# Patient Record
Sex: MALE | Employment: FULL TIME | URBAN - METROPOLITAN AREA
[De-identification: names, ages, dates, MRNs, and addresses within clinical notes are randomized per-mention and may not be internally consistent; named-entity substitution may affect disease eponyms.]

---

## 2023-08-20 PROBLEM — K64.4 EXTERNAL HEMORRHOID: Status: ACTIVE | Noted: 2023-08-20

## 2023-08-20 PROBLEM — L03.116 CELLULITIS OF LEFT LOWER LEG: Status: ACTIVE | Noted: 2023-08-20

## 2023-08-20 PROBLEM — I10 BENIGN ESSENTIAL HYPERTENSION: Status: ACTIVE | Noted: 2023-08-20

## 2023-08-20 PROBLEM — E66.9 CLASS 2 OBESITY WITH BODY MASS INDEX (BMI) OF 35.0 TO 35.9 IN ADULT: Status: ACTIVE | Noted: 2023-08-20

## 2023-08-20 PROBLEM — E55.9 VITAMIN D DEFICIENCY: Status: ACTIVE | Noted: 2023-08-20

## 2023-08-20 PROBLEM — K62.3 PARTIAL RECTAL PROLAPSE: Status: ACTIVE | Noted: 2023-08-20

## 2023-08-20 PROBLEM — R73.9 HYPERGLYCEMIA: Status: ACTIVE | Noted: 2023-08-20

## 2023-08-20 PROBLEM — N52.9 INABILITY TO MAINTAIN ERECTION: Status: ACTIVE | Noted: 2023-08-20

## 2023-08-20 PROBLEM — E66.812 CLASS 2 OBESITY WITH BODY MASS INDEX (BMI) OF 35.0 TO 35.9 IN ADULT: Status: ACTIVE | Noted: 2023-08-20

## 2023-08-20 PROBLEM — N40.0 BPH (BENIGN PROSTATIC HYPERPLASIA): Status: ACTIVE | Noted: 2023-08-20

## 2023-08-20 PROBLEM — K64.8 INTERNAL HEMORRHOIDS: Status: ACTIVE | Noted: 2023-08-20

## 2023-08-20 PROBLEM — E78.5 HYPERLIPIDEMIA: Status: ACTIVE | Noted: 2023-08-20

## 2023-08-20 PROBLEM — E29.1 HYPOGONADISM IN MALE: Status: ACTIVE | Noted: 2023-08-20

## 2023-08-20 PROBLEM — I80.00 THROMBOPHLEBITIS OF LEG, SUPERFICIAL: Status: ACTIVE | Noted: 2023-08-20

## 2023-08-20 RX ORDER — AMLODIPINE AND BENAZEPRIL HYDROCHLORIDE 10; 40 MG/1; MG/1
1 CAPSULE ORAL DAILY
COMMUNITY
End: 2023-10-25

## 2023-08-20 RX ORDER — MELATONIN 3 MG
1 CAPSULE ORAL NIGHTLY
COMMUNITY

## 2023-08-20 RX ORDER — ERGOCALCIFEROL 1.25 MG/1
1.25 CAPSULE ORAL
COMMUNITY
Start: 2021-12-08

## 2023-08-20 RX ORDER — ATORVASTATIN CALCIUM 20 MG/1
20 TABLET, FILM COATED ORAL DAILY
COMMUNITY
End: 2023-10-25

## 2023-08-20 RX ORDER — HYDROCORTISONE 25 MG/G
CREAM TOPICAL NIGHTLY
COMMUNITY
End: 2023-10-02 | Stop reason: ALTCHOICE

## 2023-08-20 RX ORDER — HYDROCHLOROTHIAZIDE 25 MG/1
25 TABLET ORAL DAILY
COMMUNITY
End: 2024-04-01 | Stop reason: SDUPTHER

## 2023-08-20 RX ORDER — ASCORBATE CALCIUM 500 MG
1 TABLET ORAL DAILY
COMMUNITY

## 2023-08-20 RX ORDER — MULTIVIT-MIN/IRON/FOLIC ACID/K 18-600-40
1 CAPSULE ORAL DAILY
COMMUNITY

## 2023-10-02 ENCOUNTER — OFFICE VISIT (OUTPATIENT)
Dept: PRIMARY CARE | Facility: CLINIC | Age: 63
End: 2023-10-02
Payer: COMMERCIAL

## 2023-10-02 VITALS
DIASTOLIC BLOOD PRESSURE: 86 MMHG | WEIGHT: 294 LBS | HEART RATE: 70 BPM | BODY MASS INDEX: 35.8 KG/M2 | SYSTOLIC BLOOD PRESSURE: 110 MMHG | HEIGHT: 76 IN

## 2023-10-02 DIAGNOSIS — E78.5 HYPERLIPIDEMIA, UNSPECIFIED HYPERLIPIDEMIA TYPE: ICD-10-CM

## 2023-10-02 DIAGNOSIS — I10 BENIGN ESSENTIAL HYPERTENSION: Primary | ICD-10-CM

## 2023-10-02 PROCEDURE — 1159F MED LIST DOCD IN RCRD: CPT | Performed by: FAMILY MEDICINE

## 2023-10-02 PROCEDURE — 1126F AMNT PAIN NOTED NONE PRSNT: CPT | Performed by: FAMILY MEDICINE

## 2023-10-02 PROCEDURE — 3079F DIAST BP 80-89 MM HG: CPT | Performed by: FAMILY MEDICINE

## 2023-10-02 PROCEDURE — 1160F RVW MEDS BY RX/DR IN RCRD: CPT | Performed by: FAMILY MEDICINE

## 2023-10-02 PROCEDURE — 99213 OFFICE O/P EST LOW 20 MIN: CPT | Performed by: FAMILY MEDICINE

## 2023-10-02 PROCEDURE — 3074F SYST BP LT 130 MM HG: CPT | Performed by: FAMILY MEDICINE

## 2023-10-02 ASSESSMENT — ENCOUNTER SYMPTOMS
LOSS OF SENSATION IN FEET: 0
DEPRESSION: 0
OCCASIONAL FEELINGS OF UNSTEADINESS: 0

## 2023-10-02 NOTE — PROGRESS NOTES
"HPI        SUBJECTIVE        OBJECTIVE          /86   Pulse 70   Ht 1.93 m (6' 4\")   Wt 133 kg (294 lb)   BMI 35.79 kg/m²     Past Medical History:   Diagnosis Date    Allergic contact dermatitis due to plants, except food 05/29/2021    Poison ivy dermatitis    Cellulitis of right upper limb 06/26/2017    Cellulitis of right upper extremity    Encounter for immunization     Encounter for immunization    Obesity, unspecified 05/11/2022    Class 2 obesity with body mass index (BMI) of 36.0 to 36.9 in adult    Personal history of other endocrine, nutritional and metabolic disease 12/08/2021    History of morbid obesity       Patient Active Problem List   Diagnosis    Benign essential hypertension    BPH (benign prostatic hyperplasia)    Hyperglycemia    Hyperlipidemia    Hypogonadism in male    Inability to maintain erection    Thrombophlebitis of leg, superficial    Vitamin D deficiency    Cellulitis of left lower leg    Class 2 obesity with body mass index (BMI) of 35.0 to 35.9 in adult    External hemorrhoid    Internal hemorrhoids    Partial rectal prolapse       Current Outpatient Medications   Medication Sig Dispense Refill    amLODIPine-benazepriL (Lotrel) 10-40 mg capsule Take 1 capsule by mouth once daily.      ascorbic acid, vitamin C, 500 mg capsule Take 1 capsule by mouth once daily.      atorvastatin (Lipitor) 20 mg tablet Take 1 tablet (20 mg) by mouth once daily.      ergocalciferol (Vitamin D-2) 1.25 MG (32299 UT) capsule Take 1 capsule (1,250 mcg) by mouth 1 (one) time per week.      GARLIC ORAL Take 1 tablet by mouth once daily.      hydroCHLOROthiazide (HYDRODiuril) 25 mg tablet Take 1 tablet (25 mg) by mouth once daily.      melatonin 3 mg capsule Take 1 capsule by mouth once daily at bedtime.      VALERIAN ROOT ORAL Take 1 capsule by mouth once daily.      vitamin E acid succinate (vitamin E succinate) 67 mg (100 unit) tablet Take 1 tablet by mouth once daily.      ZINC ORAL Take 1 tablet " by mouth once daily.       No current facility-administered medications for this visit.       ASSESSMENT/PLAN    Patient is here for had hemorrhoid surgery new procedure it went well.  Does need to see the eye doctor wrote that down for him.  1.  Hypertension controlled 2.  Hyperlipidemia patient has had lab work Saturday at [facility] that did not interface yet with chart will call me in the next week 3.  Increased BMI identification weight loss possibility patient otherwise has good healthy habits no high risk.  He did the Cologuard it was negative.  Was no apparent distress vital signs stable BMI is noted  Neck supple lungs clear to station normal S1-S2 without murmur thrill or rub  1.  Hypertension controlled presently does not need refill of medications going forward if he does let me know 2.  Hypercholesterolemia the but will check lab work that was done over the weekend be sure within a week to hear from us if not give us a call 3.  Increase BMI down modification weight loss possibility continue with good health habits and proceed from there questions concerns addressed.    There are no diagnoses linked to this encounter.

## 2024-04-01 ENCOUNTER — OFFICE VISIT (OUTPATIENT)
Dept: PRIMARY CARE | Facility: CLINIC | Age: 64
End: 2024-04-01
Payer: MEDICARE

## 2024-04-01 ENCOUNTER — APPOINTMENT (OUTPATIENT)
Dept: PRIMARY CARE | Facility: CLINIC | Age: 64
End: 2024-04-01
Payer: MEDICARE

## 2024-04-01 VITALS
TEMPERATURE: 98 F | BODY MASS INDEX: 36.53 KG/M2 | SYSTOLIC BLOOD PRESSURE: 165 MMHG | HEIGHT: 76 IN | WEIGHT: 300 LBS | DIASTOLIC BLOOD PRESSURE: 96 MMHG | HEART RATE: 69 BPM

## 2024-04-01 DIAGNOSIS — Z11.3 SCREEN FOR STD (SEXUALLY TRANSMITTED DISEASE): ICD-10-CM

## 2024-04-01 DIAGNOSIS — Z87.891 ENCOUNTER FOR SCREENING FOR ABDOMINAL AORTIC ANEURYSM (AAA) IN PATIENT 50 YEARS OF AGE OR OLDER WITH HISTORY OF SMOKING: ICD-10-CM

## 2024-04-01 DIAGNOSIS — E78.49 OTHER HYPERLIPIDEMIA: ICD-10-CM

## 2024-04-01 DIAGNOSIS — R20.8 DECREASED SENSE OF VIBRATION: ICD-10-CM

## 2024-04-01 DIAGNOSIS — Z23 ENCOUNTER FOR IMMUNIZATION: ICD-10-CM

## 2024-04-01 DIAGNOSIS — R73.9 HYPERGLYCEMIA: ICD-10-CM

## 2024-04-01 DIAGNOSIS — E55.9 VITAMIN D DEFICIENCY: ICD-10-CM

## 2024-04-01 DIAGNOSIS — Z13.29 THYROID DISORDER SCREENING: ICD-10-CM

## 2024-04-01 DIAGNOSIS — E78.2 MIXED HYPERLIPIDEMIA: ICD-10-CM

## 2024-04-01 DIAGNOSIS — R73.03 PREDIABETES: ICD-10-CM

## 2024-04-01 DIAGNOSIS — R20.2 TINGLING OF BOTH FEET: ICD-10-CM

## 2024-04-01 DIAGNOSIS — I10 BENIGN ESSENTIAL HYPERTENSION: Primary | ICD-10-CM

## 2024-04-01 DIAGNOSIS — Z13.6 ENCOUNTER FOR SCREENING FOR ABDOMINAL AORTIC ANEURYSM (AAA) IN PATIENT 50 YEARS OF AGE OR OLDER WITH HISTORY OF SMOKING: ICD-10-CM

## 2024-04-01 PROCEDURE — 1159F MED LIST DOCD IN RCRD: CPT | Performed by: INTERNAL MEDICINE

## 2024-04-01 PROCEDURE — 90677 PCV20 VACCINE IM: CPT | Performed by: INTERNAL MEDICINE

## 2024-04-01 PROCEDURE — 3080F DIAST BP >= 90 MM HG: CPT | Performed by: INTERNAL MEDICINE

## 2024-04-01 PROCEDURE — 90471 IMMUNIZATION ADMIN: CPT | Performed by: INTERNAL MEDICINE

## 2024-04-01 PROCEDURE — 99214 OFFICE O/P EST MOD 30 MIN: CPT | Performed by: INTERNAL MEDICINE

## 2024-04-01 PROCEDURE — 1160F RVW MEDS BY RX/DR IN RCRD: CPT | Performed by: INTERNAL MEDICINE

## 2024-04-01 PROCEDURE — 3077F SYST BP >= 140 MM HG: CPT | Performed by: INTERNAL MEDICINE

## 2024-04-01 RX ORDER — HYDROCHLOROTHIAZIDE 25 MG/1
25 TABLET ORAL DAILY
Qty: 90 TABLET | Refills: 1 | Status: SHIPPED | OUTPATIENT
Start: 2024-04-01 | End: 2024-09-28

## 2024-04-01 RX ORDER — AMLODIPINE AND BENAZEPRIL HYDROCHLORIDE 10; 40 MG/1; MG/1
1 CAPSULE ORAL DAILY
Qty: 90 CAPSULE | Refills: 1 | Status: SHIPPED | OUTPATIENT
Start: 2024-04-01 | End: 2024-09-28

## 2024-04-01 RX ORDER — ATORVASTATIN CALCIUM 20 MG/1
20 TABLET, FILM COATED ORAL EVERY OTHER DAY
Qty: 45 TABLET | Refills: 1 | Status: SHIPPED | OUTPATIENT
Start: 2024-04-01 | End: 2024-09-28

## 2024-04-01 ASSESSMENT — PATIENT HEALTH QUESTIONNAIRE - PHQ9
1. LITTLE INTEREST OR PLEASURE IN DOING THINGS: NOT AT ALL
2. FEELING DOWN, DEPRESSED OR HOPELESS: NOT AT ALL
SUM OF ALL RESPONSES TO PHQ9 QUESTIONS 1 AND 2: 0

## 2024-04-01 ASSESSMENT — ENCOUNTER SYMPTOMS
DEPRESSION: 0
OCCASIONAL FEELINGS OF UNSTEADINESS: 0
LOSS OF SENSATION IN FEET: 0

## 2024-04-01 NOTE — PROGRESS NOTES
"Subjective   Patient ID: Rupert Calderon is a 64 y.o. male who presents for Establish Care.    HPI Patient missed a couple days of blood pressure medication and now having some elevated blood pressures. Bottom of feet tingling.  This has been present for several months. Not pain. Patient has noticed more in the last 6 months.  Patient has sensation that the ankle of the right is tight. No leg swelling, Denies any changes in shoes. No pale feet or cold toes. No claudication symptoms. He will use Epsom salt and water as hot as he can take it.  Patient needs refills today on chronic medications for blood pressure, I cholesterol also due for pneumonia vaccine.  Patient also due for abdominal arctic aneurysm screening which we discussed given prior history of smoking.    Review of Systems   Constitutional:  Negative for chills and fever.   HENT:  Negative for sore throat.    Eyes:  Negative for visual disturbance.   Respiratory:  Negative for cough and shortness of breath.    Cardiovascular:  Negative for chest pain, palpitations and leg swelling.   Gastrointestinal:  Negative for abdominal pain, blood in stool, constipation, nausea and vomiting.   Genitourinary:  Negative for dysuria.   Skin:  Negative for rash.   Neurological:  Positive for numbness. Negative for dizziness, seizures, speech difficulty and light-headedness.   Psychiatric/Behavioral:  Negative for agitation.        Objective   BP (!) 165/96   Pulse 69   Temp 36.7 °C (98 °F) (Temporal)   Ht 1.93 m (6' 4\")   Wt 136 kg (300 lb)   BMI 36.52 kg/m²     Physical Exam  Vitals and nursing note reviewed.   Constitutional:       General: He is not in acute distress.     Appearance: Normal appearance. He is obese. He is not toxic-appearing.   HENT:      Head: Normocephalic and atraumatic.      Mouth/Throat:      Mouth: Mucous membranes are moist.      Pharynx: Oropharynx is clear. No oropharyngeal exudate.   Eyes:      Pupils: Pupils are equal, round, and " reactive to light.   Cardiovascular:      Rate and Rhythm: Normal rate and regular rhythm.      Heart sounds: Normal heart sounds.   Pulmonary:      Effort: Pulmonary effort is normal. No respiratory distress.      Breath sounds: Normal breath sounds. No wheezing or rales.   Abdominal:      General: Bowel sounds are normal. There is no distension.      Palpations: Abdomen is soft.      Tenderness: There is no abdominal tenderness.   Musculoskeletal:      Cervical back: Neck supple.      Right lower leg: No edema.      Left lower leg: No edema.   Lymphadenopathy:      Cervical: No cervical adenopathy.   Skin:     General: Skin is warm and dry.      Findings: No rash.   Neurological:      General: No focal deficit present.      Mental Status: He is alert and oriented to person, place, and time.      Cranial Nerves: No cranial nerve deficit.      Sensory: Sensory deficit (patient has decreased vibration sense with tuning fork exam) present.      Motor: No weakness.   Psychiatric:         Mood and Affect: Mood normal.         Behavior: Behavior normal.         Thought Content: Thought content normal.         Judgment: Judgment normal.         Assessment/Plan   Problem List Items Addressed This Visit             ICD-10-CM    Benign essential hypertension - Primary I10    Relevant Medications    amLODIPine-benazepriL (Lotrel) 10-40 mg capsule    hydroCHLOROthiazide (HYDRODiuril) 25 mg tablet    Other Relevant Orders    CBC and Auto Differential    Comprehensive metabolic panel    Vascular US abdominal aorta anuerysm AAA screening    Hyperglycemia R73.9    Hyperlipidemia E78.5    Relevant Medications    atorvastatin (Lipitor) 20 mg tablet    Other Relevant Orders    Lipid panel    Vitamin D deficiency E55.9    Relevant Orders    Vitamin D 25-Hydroxy,Total (for eval of Vitamin D levels)    Prediabetes R73.03    Relevant Orders    Hemoglobin A1C    Thyroid disorder screening Z13.29    Relevant Orders    TSH with reflex to  Free T4 if abnormal    Encounter for screening for abdominal aortic aneurysm (AAA) in patient 50 years of age or older with history of smoking Z13.6, Z87.891    Relevant Orders    Vascular US abdominal aorta anuerysm AAA screening    Encounter for immunization Z23    Relevant Orders    Pneumococcal conjugate vaccine, 20-valent (PREVNAR 20) (Completed)    Tingling of both feet R20.2    Relevant Orders    Vitamin B12    Syphilis Screen with Reflex    Screen for STD (sexually transmitted disease) Z11.3    Decreased sense of vibration R20.8    Relevant Orders    Syphilis Screen with Reflex     Hypertension: Refills provided for amlodipine benazepril and hydrochlorothiazide.  States that he missed his blood pressure medication for couple days in a row likely contributing to his elevated blood pressure today.    Hyperlipidemia: Refills provided for atorvastatin lipid panel has been ordered    Vitamin D deficiency: We will check a vitamin D level    Prediabetes: Check hemoglobin A1c    Thyroid disorder screening: Check a TSH level    Encounter for screening for abdominal aortic aneurysm: Vascular ultrasound has been ordered    Counter for immunization: Will give the Prevnar 20 vaccine today    Tingling of both feet: We will check a vitamin B12 level and syphilis screening.  We discussed referral to neurology which she defers at this time.

## 2024-04-03 ENCOUNTER — APPOINTMENT (OUTPATIENT)
Dept: PRIMARY CARE | Facility: CLINIC | Age: 64
End: 2024-04-03
Payer: MEDICARE

## 2024-04-30 ENCOUNTER — HOSPITAL ENCOUNTER (OUTPATIENT)
Dept: CARDIOLOGY | Facility: CLINIC | Age: 64
Discharge: HOME | End: 2024-04-30
Payer: MEDICARE

## 2024-04-30 DIAGNOSIS — Z87.891 ENCOUNTER FOR SCREENING FOR ABDOMINAL AORTIC ANEURYSM (AAA) IN PATIENT 50 YEARS OF AGE OR OLDER WITH HISTORY OF SMOKING: ICD-10-CM

## 2024-04-30 DIAGNOSIS — I10 BENIGN ESSENTIAL HYPERTENSION: ICD-10-CM

## 2024-04-30 DIAGNOSIS — Z13.6 ENCOUNTER FOR SCREENING FOR ABDOMINAL AORTIC ANEURYSM (AAA) IN PATIENT 50 YEARS OF AGE OR OLDER WITH HISTORY OF SMOKING: ICD-10-CM

## 2024-04-30 PROBLEM — Z23 ENCOUNTER FOR IMMUNIZATION: Status: ACTIVE | Noted: 2024-04-30

## 2024-04-30 PROBLEM — R73.03 PREDIABETES: Status: ACTIVE | Noted: 2024-04-30

## 2024-04-30 PROBLEM — Z11.3 SCREEN FOR STD (SEXUALLY TRANSMITTED DISEASE): Status: ACTIVE | Noted: 2024-04-30

## 2024-04-30 PROBLEM — R20.2 TINGLING OF BOTH FEET: Status: ACTIVE | Noted: 2024-04-30

## 2024-04-30 PROBLEM — R20.8 DECREASED SENSE OF VIBRATION: Status: ACTIVE | Noted: 2024-04-30

## 2024-04-30 PROBLEM — Z13.29 THYROID DISORDER SCREENING: Status: ACTIVE | Noted: 2024-04-30

## 2024-04-30 PROCEDURE — 76706 US ABDL AORTA SCREEN AAA: CPT | Performed by: INTERNAL MEDICINE

## 2024-04-30 PROCEDURE — 76706 US ABDL AORTA SCREEN AAA: CPT

## 2024-04-30 ASSESSMENT — ENCOUNTER SYMPTOMS
NUMBNESS: 1
SPEECH DIFFICULTY: 0
ABDOMINAL PAIN: 0
NAUSEA: 0
AGITATION: 0
FEVER: 0
SHORTNESS OF BREATH: 0
CHILLS: 0
SEIZURES: 0
DYSURIA: 0
LIGHT-HEADEDNESS: 0
SORE THROAT: 0
PALPITATIONS: 0
VOMITING: 0
CONSTIPATION: 0
COUGH: 0
BLOOD IN STOOL: 0
DIZZINESS: 0

## 2024-05-28 ENCOUNTER — TELEPHONE (OUTPATIENT)
Dept: PRIMARY CARE | Facility: CLINIC | Age: 64
End: 2024-05-28
Payer: MEDICARE

## 2024-05-28 DIAGNOSIS — E55.9 VITAMIN D DEFICIENCY: ICD-10-CM

## 2024-05-28 DIAGNOSIS — R73.03 PREDIABETES: ICD-10-CM

## 2024-05-28 DIAGNOSIS — Z12.5 PROSTATE CANCER SCREENING: ICD-10-CM

## 2024-05-28 DIAGNOSIS — E78.5 HYPERLIPIDEMIA, UNSPECIFIED HYPERLIPIDEMIA TYPE: ICD-10-CM

## 2024-05-28 DIAGNOSIS — E03.8 SUBCLINICAL HYPOTHYROIDISM: ICD-10-CM

## 2024-05-28 DIAGNOSIS — I10 BENIGN ESSENTIAL HYPERTENSION: ICD-10-CM

## 2024-08-07 ENCOUNTER — PATIENT OUTREACH (OUTPATIENT)
Dept: PRIMARY CARE | Facility: CLINIC | Age: 64
End: 2024-08-07
Payer: MEDICARE

## 2024-08-07 NOTE — PROGRESS NOTES
Discharge Facility:  [facility]    Discharge Diagnosis:  -Acute submassive PE, appears unprovoked  -Acute right lower extremity DVT  -Thrombocytopenia  -Hypertension     Admission Date:  8/4/24  Discharge Date:   8/6/24    PCP Appointment Date: TBD-I am unable to make an appt due to no openings . Message sent to office staff requesting assistance.     Specialist Appointment Date:   When: 2 to 4 weeks   With: GIULIA MORAN, Vascular Surgery  [phone]    Hospital Encounter and Summary Linked: Yes    See discharge assessment below for further details    Medications  Medications reviewed with patient/caregiver?: Yes (8/7/2024 10:48 AM)  Is the patient having any side effects they believe may be caused by any medication additions or changes?: No (8/7/2024 10:48 AM)  Does the patient have all medications ordered at discharge?: Yes (8/7/2024 10:48 AM)  Prescription Comments: apixaban 5 mg oral tablet 5 mg 1 tabs, ORAL, BID, 60 tabs, Date: 8/6/24 10:41:00 AM EDT, [facility], Tablet, 1 tabs ORAL BID,   Eliquis Starter Pack for Treatment of DVT and PE 5 mg oral tablet 1 packets, ORAL, ONCE, Meds to beds. Please use free 30 day coupon. as directed on package labeling;, 1 EA, Date: 8/6/24 10:40:00 AM EDT, [facility], 1 packets ORAL ONCE,x30 days,Instr:Meds to beds. Please use free 30 day coupon. as directed on package labeling;, 193.04, 08/04/2024 22:38:00 EDT, Height/Length Dosing, cm, 130.5, 08/05/2024 05:07:00 EDT, Weight Dosing, kg Start Date: 8/6/24 Stop Date: 9/5/24 (8/7/2024 10:48 AM)  Is the patient taking all medications as directed (includes completed medication regime)?: Yes (8/7/2024 10:48 AM)  Medication Comments: CM discussed referencing discharge paperwork to follow detailed daily medication schedule. Reminded to bring in all medications. (Old & New) to future appointments. Patient endorses understanding & compliance with medications. (8/7/2024 10:48  AM)    Appointments  Does the patient have a primary care provider?: Yes (8/7/2024 10:48 AM)  Care Management Interventions: Advised patient to make appointment; Educated patient on importance of making appointment (CM sent message to clerical staff to request the reach out to pt to schedule a follow up with Dr Lucas) (8/7/2024 10:48 AM)  Has the patient kept scheduled appointments due by today?: Yes (8/7/2024 10:48 AM)  Care Management Interventions: Advised to schedule with specialist (Pt will call vascular surgery for appt) (8/7/2024 10:48 AM)    Self Management  Has home health visited the patient within 72 hours of discharge?: Not applicable (8/7/2024 10:48 AM)  What Durable Medical Equipment (DME) was ordered?: n/a (8/7/2024 10:48 AM)    Patient Teaching  Does the patient have access to their discharge instructions?: Yes (8/7/2024 10:48 AM)  Care Management Interventions: Reviewed instructions with patient; Educated on MyChart (Acitve on MyChart) (8/7/2024 10:48 AM)  What is the patient's perception of their health status since discharge?: Improving (8/7/2024 10:48 AM)  Is the patient/caregiver able to teach back the hierarchy of who to call/visit for symptoms/problems? PCP, Specialist, Home Health nurse, Urgent Care, ED, 911: Yes (8/7/2024 10:48 AM)  Patient/Caregiver Education Comments: Successful transition of care outreach with patient. CM introduced myself and the TCM program to Rupert Calderon. Reviewed hospital stay and answered any questions. Patient denies any further discharge questions/needs at this time. CM gave my contact information and encouraged to call if needing assistance or has any further non-emergent questions prior to my next outreach. (8/7/2024 10:48 AM)    Wrap Up  Is the patient/caregiver familiar with Advance Care Planning?: Yes (8/7/2024 10:48 AM)  Would the patient like more information on Advance Care Planning?: Yes (Advanced Directives Packet (sharing your wishes)  [email or web address]) (8/7/2024 10:48 AM)  Wrap Up Additional Comments: Pt would like to report that he had excellent care at [facility] (8/7/2024 10:48 AM)

## 2024-08-12 RX ORDER — LANOLIN ALCOHOL/MO/W.PET/CERES
1000 CREAM (GRAM) TOPICAL DAILY
COMMUNITY
Start: 2023-07-28

## 2024-08-12 RX ORDER — APIXABAN 5 MG (74)
5 KIT ORAL DAILY
COMMUNITY
Start: 2024-08-06 | End: 2024-08-13

## 2024-08-12 RX ORDER — HYDROCORTISONE 25 MG/G
CREAM TOPICAL
COMMUNITY
Start: 2023-07-24

## 2024-08-12 RX ORDER — UREA 10 %
220 LOTION (ML) TOPICAL DAILY
COMMUNITY
Start: 2024-08-04

## 2024-08-13 ENCOUNTER — APPOINTMENT (OUTPATIENT)
Dept: PRIMARY CARE | Facility: CLINIC | Age: 64
End: 2024-08-13
Payer: MEDICARE

## 2024-08-13 VITALS
SYSTOLIC BLOOD PRESSURE: 120 MMHG | BODY MASS INDEX: 34.95 KG/M2 | WEIGHT: 287 LBS | HEIGHT: 76 IN | HEART RATE: 81 BPM | DIASTOLIC BLOOD PRESSURE: 71 MMHG

## 2024-08-13 DIAGNOSIS — I82.4Y1 ACUTE DEEP VEIN THROMBOSIS (DVT) OF PROXIMAL VEIN OF RIGHT LOWER EXTREMITY (MULTI): ICD-10-CM

## 2024-08-13 DIAGNOSIS — E55.9 VITAMIN D DEFICIENCY: ICD-10-CM

## 2024-08-13 DIAGNOSIS — I26.94 MULTIPLE SUBSEGMENTAL PULMONARY EMBOLI WITHOUT ACUTE COR PULMONALE (MULTI): Primary | ICD-10-CM

## 2024-08-13 PROCEDURE — 3074F SYST BP LT 130 MM HG: CPT | Performed by: FAMILY MEDICINE

## 2024-08-13 PROCEDURE — 1159F MED LIST DOCD IN RCRD: CPT | Performed by: FAMILY MEDICINE

## 2024-08-13 PROCEDURE — 99495 TRANSJ CARE MGMT MOD F2F 14D: CPT | Performed by: FAMILY MEDICINE

## 2024-08-13 PROCEDURE — 3008F BODY MASS INDEX DOCD: CPT | Performed by: FAMILY MEDICINE

## 2024-08-13 PROCEDURE — 3078F DIAST BP <80 MM HG: CPT | Performed by: FAMILY MEDICINE

## 2024-08-13 PROCEDURE — 1036F TOBACCO NON-USER: CPT | Performed by: FAMILY MEDICINE

## 2024-08-13 RX ORDER — ERGOCALCIFEROL 1.25 MG/1
1 CAPSULE ORAL
Qty: 13 CAPSULE | Refills: 0 | OUTPATIENT
Start: 2024-08-13

## 2024-08-13 RX ORDER — GUAIFENESIN/PHENYLPROPANOLAMIN
500 EXPECTORANT ORAL DAILY
COMMUNITY
Start: 2023-07-28

## 2024-08-13 RX ORDER — ERGOCALCIFEROL 1.25 MG/1
1.25 CAPSULE ORAL
Qty: 25 CAPSULE | Refills: 0 | Status: SHIPPED | OUTPATIENT
Start: 2024-08-13 | End: 2025-02-09

## 2024-08-13 RX ORDER — ASCORBIC ACID 300 MG
1 TABLET,CHEWABLE ORAL DAILY
COMMUNITY
Start: 2023-07-28

## 2024-08-13 ASSESSMENT — ENCOUNTER SYMPTOMS
CHEST TIGHTNESS: 0
ACTIVITY CHANGE: 0
SHORTNESS OF BREATH: 1

## 2024-08-13 ASSESSMENT — PATIENT HEALTH QUESTIONNAIRE - PHQ9
1. LITTLE INTEREST OR PLEASURE IN DOING THINGS: NOT AT ALL
SUM OF ALL RESPONSES TO PHQ9 QUESTIONS 1 AND 2: 0
2. FEELING DOWN, DEPRESSED OR HOPELESS: NOT AT ALL

## 2024-08-13 NOTE — PROGRESS NOTES
"Subjective   Patient ID: John Calderon is a 64 y.o. male who presents for Hospital Follow-up ([facility] blood clots in his lungs).    Pulmonary embolism and DVT   - reports he was working in the yard when he developed sudden onset shortness of breath   - he was admitted to the hospital from 6/4/24-6/6/24 for PE and bilateral DVT   - during admission he was started on elequis and was discharged home in stable condition   - since discharge home he has had gradual improvement in symptoms   - back to cutting grass, doing laundry etc.   - no headaches, no palpitations, no dizziness or headaches   - has plans to follow up with hematology and have repeat ultrasound          Review of Systems   Constitutional:  Negative for activity change.   Respiratory:  Positive for shortness of breath. Negative for chest tightness.        Objective   /71   Pulse 81   Ht 1.93 m (6' 4\")   Wt 130 kg (287 lb)   BMI 34.93 kg/m²     Physical Exam  Constitutional:       General: He is not in acute distress.     Appearance: Normal appearance.   HENT:      Mouth/Throat:      Mouth: Mucous membranes are moist.      Pharynx: Oropharynx is clear.   Cardiovascular:      Rate and Rhythm: Normal rate and regular rhythm.   Pulmonary:      Effort: No respiratory distress.      Breath sounds: No stridor. No wheezing.   Neurological:      Mental Status: He is alert.   Psychiatric:         Mood and Affect: Mood normal.         Behavior: Behavior normal.         Assessment/Plan   Problem List Items Addressed This Visit             ICD-10-CM       Coag and Thromboembolic    Multiple subsegmental pulmonary emboli without acute cor pulmonale (Multi) - Primary I26.94     Stable   - continue on elequis as prescribed   - recommend f/u with hematology   - has f/u imaging scheduled          Relevant Medications    apixaban (Eliquis) 5 mg tablet       Endocrine/Metabolic    Vitamin D deficiency E55.9    Relevant Medications    ergocalciferol (Vitamin D-2) " 1.25 MG (55833 UT) capsule     Other Visit Diagnoses         Codes    Acute deep vein thrombosis (DVT) of proximal vein of right lower extremity (Multi)     I82.4Y1    stable   - continue elequis     Relevant Medications    apixaban (Eliquis) 5 mg tablet

## 2024-08-13 NOTE — ASSESSMENT & PLAN NOTE
Stable   - continue on elequis as prescribed   - recommend f/u with hematology   - has f/u imaging scheduled

## 2024-08-29 ENCOUNTER — PATIENT OUTREACH (OUTPATIENT)
Dept: PRIMARY CARE | Facility: CLINIC | Age: 64
End: 2024-08-29
Payer: MEDICARE

## 2024-08-29 NOTE — PROGRESS NOTES
Call regarding appt. with PCP office on 8/13/24 after hospitalization.  At time of outreach call the patient feels as if their condition has improved since last visit.  Reviewed the PCP appointment with the pt and addressed any questions or concerns.   Pt also saw Cardio for follow up and has stress test scheduled in about a week.

## 2024-08-30 ENCOUNTER — TELEPHONE (OUTPATIENT)
Dept: PRIMARY CARE | Facility: CLINIC | Age: 64
End: 2024-08-30
Payer: MEDICARE

## 2024-08-30 DIAGNOSIS — I26.94 MULTIPLE SUBSEGMENTAL PULMONARY EMBOLI WITHOUT ACUTE COR PULMONALE (MULTI): ICD-10-CM

## 2024-08-30 DIAGNOSIS — I82.4Y1 ACUTE DEEP VEIN THROMBOSIS (DVT) OF PROXIMAL VEIN OF RIGHT LOWER EXTREMITY (MULTI): ICD-10-CM

## 2024-09-27 ENCOUNTER — TELEPHONE (OUTPATIENT)
Dept: PRIMARY CARE | Facility: CLINIC | Age: 64
End: 2024-09-27
Payer: MEDICARE

## 2024-09-27 NOTE — TELEPHONE ENCOUNTER
----- Message from Carlito Caruso sent at 9/27/2024  3:58 PM EDT -----  Labs showed cholesterol levels to be normal, glucose was 1 point above normal at 100, kidney function is normal electrolytes normal, normal liver enzymes.  Average sugar level was elevated at 6.2% in the prediabetes range.  Thyroid level was normal a  nd blood counts were normal as well including white blood cell count red blood cell count and platelets.  Vitamin D level was normal as well.

## 2024-10-07 ENCOUNTER — APPOINTMENT (OUTPATIENT)
Dept: PRIMARY CARE | Facility: CLINIC | Age: 64
End: 2024-10-07
Payer: MEDICARE

## 2024-10-07 VITALS
BODY MASS INDEX: 34.83 KG/M2 | HEIGHT: 76 IN | SYSTOLIC BLOOD PRESSURE: 123 MMHG | TEMPERATURE: 97.6 F | DIASTOLIC BLOOD PRESSURE: 72 MMHG | WEIGHT: 286 LBS | HEART RATE: 67 BPM

## 2024-10-07 DIAGNOSIS — E78.49 OTHER HYPERLIPIDEMIA: ICD-10-CM

## 2024-10-07 DIAGNOSIS — L72.9 SCALP CYST: ICD-10-CM

## 2024-10-07 DIAGNOSIS — E78.2 MIXED HYPERLIPIDEMIA: ICD-10-CM

## 2024-10-07 DIAGNOSIS — M79.642 PAIN OF LEFT HAND: ICD-10-CM

## 2024-10-07 DIAGNOSIS — I10 BENIGN ESSENTIAL HYPERTENSION: Primary | ICD-10-CM

## 2024-10-07 DIAGNOSIS — E55.9 VITAMIN D DEFICIENCY: ICD-10-CM

## 2024-10-07 DIAGNOSIS — R73.03 PREDIABETES: ICD-10-CM

## 2024-10-07 PROCEDURE — 3074F SYST BP LT 130 MM HG: CPT | Performed by: INTERNAL MEDICINE

## 2024-10-07 PROCEDURE — 1158F ADVNC CARE PLAN TLK DOCD: CPT | Performed by: INTERNAL MEDICINE

## 2024-10-07 PROCEDURE — 3078F DIAST BP <80 MM HG: CPT | Performed by: INTERNAL MEDICINE

## 2024-10-07 PROCEDURE — 1159F MED LIST DOCD IN RCRD: CPT | Performed by: INTERNAL MEDICINE

## 2024-10-07 PROCEDURE — 1160F RVW MEDS BY RX/DR IN RCRD: CPT | Performed by: INTERNAL MEDICINE

## 2024-10-07 PROCEDURE — 99214 OFFICE O/P EST MOD 30 MIN: CPT | Performed by: INTERNAL MEDICINE

## 2024-10-07 PROCEDURE — 3008F BODY MASS INDEX DOCD: CPT | Performed by: INTERNAL MEDICINE

## 2024-10-07 PROCEDURE — 1123F ACP DISCUSS/DSCN MKR DOCD: CPT | Performed by: INTERNAL MEDICINE

## 2024-10-07 RX ORDER — METHYLPREDNISOLONE 4 MG/1
TABLET ORAL
Qty: 21 TABLET | Refills: 0 | Status: SHIPPED | OUTPATIENT
Start: 2024-10-07 | End: 2024-10-14

## 2024-10-07 RX ORDER — AMLODIPINE AND BENAZEPRIL HYDROCHLORIDE 10; 40 MG/1; MG/1
1 CAPSULE ORAL DAILY
Qty: 90 CAPSULE | Refills: 1 | Status: SHIPPED | OUTPATIENT
Start: 2024-10-07 | End: 2025-04-05

## 2024-10-07 RX ORDER — DICLOFENAC SODIUM 10 MG/G
4 GEL TOPICAL 4 TIMES DAILY
Qty: 100 G | Refills: 1 | Status: SHIPPED | OUTPATIENT
Start: 2024-10-07

## 2024-10-07 RX ORDER — HYDROCHLOROTHIAZIDE 25 MG/1
25 TABLET ORAL DAILY
Qty: 90 TABLET | Refills: 1 | Status: SHIPPED | OUTPATIENT
Start: 2024-10-07 | End: 2025-04-05

## 2024-10-07 RX ORDER — ATORVASTATIN CALCIUM 20 MG/1
20 TABLET, FILM COATED ORAL EVERY OTHER DAY
Qty: 45 TABLET | Refills: 1 | Status: SHIPPED | OUTPATIENT
Start: 2024-10-07 | End: 2025-04-05

## 2024-10-07 ASSESSMENT — PATIENT HEALTH QUESTIONNAIRE - PHQ9
2. FEELING DOWN, DEPRESSED OR HOPELESS: NOT AT ALL
1. LITTLE INTEREST OR PLEASURE IN DOING THINGS: NOT AT ALL
SUM OF ALL RESPONSES TO PHQ9 QUESTIONS 1 AND 2: 0

## 2024-10-07 NOTE — PROGRESS NOTES
"Subjective   Patient ID: John Calderon is a 64 y.o. male who presents for Follow-up (6 month/He is having left hand soreness and pain /He has some lumps on his head that he would like it removed.) and Results (Lab review).    HPI My left hand hurts \"like a mother\", my hand is sore to the touch. Patient noticed radiation of the pain with driving with his hand on the 12 oclock position of the steering wheel. This first started getting worse over the last 6 weeks. Playing golf it doesn't hurt as well but he has lost 20 yards off of his shot. Patient is right handed. Patient denies weakness or numbness.  No associated neck pain.     Patient has noticed some skin lesion of the left scalp that he has noticed for \"a while\". Denies bleeding but there is tenderness. They are cyst like lesions of the scalp.  Denies fever or chills.  He is interested in having the lesions removed.    Patient also here for medication refills for blood pressure medication cholesterol medication.  Labs showed cholesterol levels to be normal, glucose was 1 point above normal at 100, kidney function is normal electrolytes normal, normal liver enzymes.  Average sugar level was elevated at 6.2% in the prediabetes range.  Thyroid level was normal and blood counts were normal as well including white blood cell count red blood cell count and platelets.  Vitamin D level was normal as well.     Review of Systems   Constitutional:  Negative for chills and fever.   HENT:  Negative for sore throat.    Eyes:  Negative for visual disturbance.   Respiratory:  Negative for cough and shortness of breath.    Cardiovascular:  Negative for chest pain and leg swelling.   Gastrointestinal:  Negative for abdominal pain, blood in stool, constipation, diarrhea, nausea and vomiting.   Skin:  Negative for rash.   Neurological:  Negative for dizziness, syncope and light-headedness.   Psychiatric/Behavioral:  Negative for agitation and confusion.        Objective   /72 " "(BP Location: Left arm, Patient Position: Sitting, BP Cuff Size: Large adult)   Pulse 67   Temp 36.4 °C (97.6 °F)   Ht 1.93 m (6' 4\")   Wt 130 kg (286 lb)   BMI 34.81 kg/m²     Physical Exam  Vitals and nursing note reviewed.   Constitutional:       General: He is not in acute distress.     Appearance: Normal appearance. He is obese. He is not toxic-appearing or diaphoretic.   HENT:      Head: Normocephalic and atraumatic.      Mouth/Throat:      Mouth: Mucous membranes are moist.      Pharynx: Oropharynx is clear. No oropharyngeal exudate.   Eyes:      Pupils: Pupils are equal, round, and reactive to light.   Cardiovascular:      Rate and Rhythm: Normal rate and regular rhythm.      Heart sounds: Normal heart sounds.   Pulmonary:      Effort: Pulmonary effort is normal. No respiratory distress.      Breath sounds: Normal breath sounds. No wheezing, rhonchi or rales.   Abdominal:      General: Bowel sounds are normal. There is no distension.      Palpations: Abdomen is soft.      Tenderness: There is no abdominal tenderness. There is no guarding.   Musculoskeletal:         General: Tenderness (Tenderness of the left hand) present. No swelling (Swelling of left hand).      Cervical back: Neck supple.      Right lower leg: No edema.      Left lower leg: No edema.   Lymphadenopathy:      Cervical: No cervical adenopathy.   Skin:     General: Skin is warm and dry.      Coloration: Skin is not jaundiced or pale.      Findings: Lesion (3 left sided scalp cyst lesions, no redness or drainage.) present. No rash.   Neurological:      General: No focal deficit present.      Mental Status: He is alert and oriented to person, place, and time.      Cranial Nerves: No cranial nerve deficit.   Psychiatric:         Mood and Affect: Mood normal.         Behavior: Behavior normal.         Thought Content: Thought content normal.         Judgment: Judgment normal.         Assessment/Plan   Problem List Items Addressed This Visit  "            ICD-10-CM    Benign essential hypertension - Primary I10    Relevant Medications    amLODIPine-benazepriL (Lotrel) 10-40 mg capsule    hydroCHLOROthiazide (HYDRODiuril) 25 mg tablet    Other Relevant Orders    CBC and Auto Differential    Hyperlipidemia E78.5    Relevant Medications    atorvastatin (Lipitor) 20 mg tablet    Other Relevant Orders    Comprehensive metabolic panel    Lipid panel    Vitamin D deficiency E55.9    Relevant Orders    Vitamin D 25-Hydroxy,Total (for eval of Vitamin D levels)    Prediabetes R73.03    Relevant Orders    Hemoglobin A1C    Scalp cyst L72.9    Relevant Orders    Referral to Plastic Surgery    Pain of left hand M79.642    Relevant Medications    diclofenac sodium (Voltaren) 1 % gel    Other Relevant Orders    XR hand left 3+ views     Pretension: Chronic, stable refills provided for amlodipine and hydrochlorothiazide    Hyperlipidemia: Chronic, stable refills provided for atorvastatin    Vitamin D deficiency: Will check a vitamin D level    Prediabetes: Check hemoglobin A1c    Scalp cyst: Currently does not appear to be infected will be referred to plastic surgery for further evaluation and treatment    Pain of left hand: Differential includes osteoarthritis, gout, pseudogout we will obtain x-rays left hand as well as diclofenac gel and Medrol Dosepak for treatment.  Follow-up if symptoms or not improve or worsening.

## 2024-10-08 ENCOUNTER — PATIENT OUTREACH (OUTPATIENT)
Dept: CARDIOLOGY | Facility: CLINIC | Age: 64
End: 2024-10-08
Payer: MEDICARE

## 2024-10-14 ENCOUNTER — TELEPHONE (OUTPATIENT)
Dept: PRIMARY CARE | Facility: CLINIC | Age: 64
End: 2024-10-14
Payer: MEDICARE

## 2024-10-14 NOTE — TELEPHONE ENCOUNTER
Patient called and asked about a surgeon for the lesions on his head.  Do not see a referral in his chart.

## 2024-10-21 ENCOUNTER — APPOINTMENT (OUTPATIENT)
Dept: PLASTIC SURGERY | Facility: CLINIC | Age: 64
End: 2024-10-21
Payer: MEDICARE

## 2024-10-21 VITALS
WEIGHT: 286.4 LBS | HEART RATE: 92 BPM | SYSTOLIC BLOOD PRESSURE: 150 MMHG | DIASTOLIC BLOOD PRESSURE: 93 MMHG | HEIGHT: 76 IN | BODY MASS INDEX: 34.88 KG/M2

## 2024-10-21 DIAGNOSIS — L72.9 SCALP CYST: Primary | ICD-10-CM

## 2024-10-21 PROCEDURE — 3080F DIAST BP >= 90 MM HG: CPT | Performed by: PHYSICIAN ASSISTANT

## 2024-10-21 PROCEDURE — 3077F SYST BP >= 140 MM HG: CPT | Performed by: PHYSICIAN ASSISTANT

## 2024-10-21 PROCEDURE — 1159F MED LIST DOCD IN RCRD: CPT | Performed by: PHYSICIAN ASSISTANT

## 2024-10-21 PROCEDURE — 99202 OFFICE O/P NEW SF 15 MIN: CPT | Performed by: PHYSICIAN ASSISTANT

## 2024-10-21 PROCEDURE — 3008F BODY MASS INDEX DOCD: CPT | Performed by: PHYSICIAN ASSISTANT

## 2024-10-21 PROCEDURE — 1123F ACP DISCUSS/DSCN MKR DOCD: CPT | Performed by: PHYSICIAN ASSISTANT

## 2024-10-24 NOTE — PROGRESS NOTES
"    Department of Plastic and Reconstructive Surgery           Initial Office Consultation    Date: 10/21/24  Primary Care Provider: Carlito Caruso DO    Subjective   Rupert Calderon \"John\" is a 64 y.o. male who was referred by Carlito Caruso DO  for evaluation of cyst on head.    He presents to discuss cysts of the head. He endorses that he has a history of sebaceous cyst that have been removed from his head in the past. He notes that he currently has 3 on the head at this time. There is a cyst above the left ear that he notes was painful when pushed on and grew larger in size. He states he has been applying triple antibiotic ointment to the cyst. He endorses that the cyst above his left ear started draining fluid.     PMH: HTN, PE 2 months ago currently on AC, DVT, HLD  Surgical Hx: hemorrhoidectomy   Family Hx: father with liver ca, colon ca  Smoking: non-smoker       Review of Systems   All other systems have been reviewed with the patient and have been found to be negative with exception to the chief complaint as mentioned in the history of present illness.    ROS: As noted in history of present illness    - CONSTITUTIONAL: Denies weight loss, fever and chills.  - HEENT: Denies changes in vision and hearing.  - RESPIRATORY: Denies SOB and cough, difficulty breathing  - CV: Denies palpitations and CP  - GI: Denies abdominal pain, nausea, vomiting and diarrhea.  - : Denies dysuria and urinary frequency.  - MSK: Denies myalgia and joint pain.  - SKIN: positive for cyst of scalp  - NEUROLOGICAL: Denies headache and syncope.      Objective   Vital Signs:   Vitals:    10/21/24 1435   BP: (!) 150/93   Pulse: 92     Gen: interactive and pleasant  Head: NCAT  Eyes: EOMI, PERRLA  Mouth: MMM  Throat: trachea midline  Cor: RRR  Pulm: nonlabored breathing  Abd: s/nt/nd  Neuro: AAOx3  Ext: extremities perfused    Focused exam of the: head   There are 3 cysts present that are soft to palpation. Above the left ear cyst " "is oozing white drainage, on the top central portion of the head there is a firm cyst present, on the right side of the head there is a cyst present with yellow overlying scab.                     Assessment/Plan     Rupert Calderon \"John\" is a 64 y.o. male who was referred by Carlito Caruso DO for evaluation of cyst on head.    Patient presents with 3 cysts of the head. The left cyst above the left ear is draining purulent fluid when pressed on. He has been applying triple antibiotic ointment to the cyst daily. I discussed these cysts with Dr Bills, in light of his PE 2 months ago currently on AC and with cyst open and draining we recommend follow up in 2 months to reevaluate the cysts and determine if we can stop AC for surgery. Continue topical ointment until cyst stops draining.       I spent 20 minutes with this patient. Greater than 50% of this time was spent in the counselling and/or coordination of care of this patient.  This note was created using voice recognition software and was not corrected for typographical or grammatical errors.    Signature: Natalie Bradley PA-C  "

## 2024-11-05 ENCOUNTER — PATIENT OUTREACH (OUTPATIENT)
Dept: PRIMARY CARE | Facility: CLINIC | Age: 64
End: 2024-11-05
Payer: MEDICARE

## 2025-01-20 ENCOUNTER — APPOINTMENT (OUTPATIENT)
Dept: PLASTIC SURGERY | Facility: CLINIC | Age: 65
End: 2025-01-20
Payer: MEDICARE

## 2025-02-10 DIAGNOSIS — I26.94 MULTIPLE SUBSEGMENTAL PULMONARY EMBOLI WITHOUT ACUTE COR PULMONALE: ICD-10-CM

## 2025-02-10 DIAGNOSIS — I82.4Y1 ACUTE DEEP VEIN THROMBOSIS (DVT) OF PROXIMAL VEIN OF RIGHT LOWER EXTREMITY (MULTI): ICD-10-CM

## 2025-02-12 ASSESSMENT — ENCOUNTER SYMPTOMS
FEVER: 0
CHILLS: 0
SHORTNESS OF BREATH: 0
DIARRHEA: 0
BLOOD IN STOOL: 0
DIZZINESS: 0
ABDOMINAL PAIN: 0
NAUSEA: 0
CONFUSION: 0
AGITATION: 0
SORE THROAT: 0
VOMITING: 0
LIGHT-HEADEDNESS: 0
CONSTIPATION: 0
COUGH: 0

## 2025-03-17 ENCOUNTER — TELEPHONE (OUTPATIENT)
Dept: PRIMARY CARE | Facility: CLINIC | Age: 65
End: 2025-03-17
Payer: MEDICARE

## 2025-03-17 NOTE — TELEPHONE ENCOUNTER
Patient called and LVM asking for a new rx for Eliquis.  I saw that a 6 month supply was sent in on 2/21/25.  I called the pharmacy to confirm that they got it, which they did.  It is ready to ship either today or tomorrow.  I called patient to let him know, but he did not answer and his VM box was not set up.

## 2025-06-23 DIAGNOSIS — I10 BENIGN ESSENTIAL HYPERTENSION: ICD-10-CM

## 2025-06-26 RX ORDER — AMLODIPINE AND BENAZEPRIL HYDROCHLORIDE 10; 40 MG/1; MG/1
1 CAPSULE ORAL DAILY
Qty: 90 CAPSULE | Refills: 0 | Status: SHIPPED | OUTPATIENT
Start: 2025-06-26

## 2025-06-26 RX ORDER — HYDROCHLOROTHIAZIDE 25 MG/1
25 TABLET ORAL DAILY
Qty: 90 TABLET | Refills: 0 | Status: SHIPPED | OUTPATIENT
Start: 2025-06-26